# Patient Record
Sex: FEMALE | Race: WHITE | NOT HISPANIC OR LATINO | Employment: FULL TIME | URBAN - METROPOLITAN AREA
[De-identification: names, ages, dates, MRNs, and addresses within clinical notes are randomized per-mention and may not be internally consistent; named-entity substitution may affect disease eponyms.]

---

## 2023-07-26 ENCOUNTER — LAB (OUTPATIENT)
Dept: LAB | Facility: LAB | Age: 23
End: 2023-07-26
Payer: COMMERCIAL

## 2023-07-26 ENCOUNTER — OFFICE VISIT (OUTPATIENT)
Dept: PRIMARY CARE | Facility: CLINIC | Age: 23
End: 2023-07-26
Payer: COMMERCIAL

## 2023-07-26 VITALS
HEIGHT: 63 IN | BODY MASS INDEX: 23.53 KG/M2 | DIASTOLIC BLOOD PRESSURE: 86 MMHG | SYSTOLIC BLOOD PRESSURE: 122 MMHG | OXYGEN SATURATION: 99 % | HEART RATE: 71 BPM | WEIGHT: 132.8 LBS

## 2023-07-26 DIAGNOSIS — G43.109 MIGRAINE WITH AURA AND WITHOUT STATUS MIGRAINOSUS, NOT INTRACTABLE: Primary | ICD-10-CM

## 2023-07-26 DIAGNOSIS — K58.2 IRRITABLE BOWEL SYNDROME WITH BOTH CONSTIPATION AND DIARRHEA: ICD-10-CM

## 2023-07-26 DIAGNOSIS — R53.83 OTHER FATIGUE: ICD-10-CM

## 2023-07-26 DIAGNOSIS — F41.0 PANIC ATTACK: ICD-10-CM

## 2023-07-26 DIAGNOSIS — F41.9 ANXIETY: ICD-10-CM

## 2023-07-26 LAB
ALANINE AMINOTRANSFERASE (SGPT) (U/L) IN SER/PLAS: 9 U/L (ref 7–45)
ALBUMIN (G/DL) IN SER/PLAS: 4.5 G/DL (ref 3.4–5)
ALKALINE PHOSPHATASE (U/L) IN SER/PLAS: 50 U/L (ref 33–110)
ANION GAP IN SER/PLAS: 11 MMOL/L (ref 10–20)
ASPARTATE AMINOTRANSFERASE (SGOT) (U/L) IN SER/PLAS: 17 U/L (ref 9–39)
BASOPHILS (10*3/UL) IN BLOOD BY AUTOMATED COUNT: 0.06 X10E9/L (ref 0–0.1)
BASOPHILS/100 LEUKOCYTES IN BLOOD BY AUTOMATED COUNT: 1.2 % (ref 0–2)
BILIRUBIN TOTAL (MG/DL) IN SER/PLAS: 0.5 MG/DL (ref 0–1.2)
CALCIUM (MG/DL) IN SER/PLAS: 9.4 MG/DL (ref 8.6–10.3)
CARBON DIOXIDE, TOTAL (MMOL/L) IN SER/PLAS: 26 MMOL/L (ref 21–32)
CHLORIDE (MMOL/L) IN SER/PLAS: 105 MMOL/L (ref 98–107)
CREATININE (MG/DL) IN SER/PLAS: 0.78 MG/DL (ref 0.5–1.05)
EOSINOPHILS (10*3/UL) IN BLOOD BY AUTOMATED COUNT: 0.11 X10E9/L (ref 0–0.7)
EOSINOPHILS/100 LEUKOCYTES IN BLOOD BY AUTOMATED COUNT: 2.2 % (ref 0–6)
ERYTHROCYTE DISTRIBUTION WIDTH (RATIO) BY AUTOMATED COUNT: 12.6 % (ref 11.5–14.5)
ERYTHROCYTE MEAN CORPUSCULAR HEMOGLOBIN CONCENTRATION (G/DL) BY AUTOMATED: 32.5 G/DL (ref 32–36)
ERYTHROCYTE MEAN CORPUSCULAR VOLUME (FL) BY AUTOMATED COUNT: 96 FL (ref 80–100)
ERYTHROCYTES (10*6/UL) IN BLOOD BY AUTOMATED COUNT: 4.12 X10E12/L (ref 4–5.2)
GFR FEMALE: >90 ML/MIN/1.73M2
GLUCOSE (MG/DL) IN SER/PLAS: 79 MG/DL (ref 74–99)
HEMATOCRIT (%) IN BLOOD BY AUTOMATED COUNT: 39.7 % (ref 36–46)
HEMOGLOBIN (G/DL) IN BLOOD: 12.9 G/DL (ref 12–16)
IMMATURE GRANULOCYTES/100 LEUKOCYTES IN BLOOD BY AUTOMATED COUNT: 0.2 % (ref 0–0.9)
LEUKOCYTES (10*3/UL) IN BLOOD BY AUTOMATED COUNT: 5 X10E9/L (ref 4.4–11.3)
LYMPHOCYTES (10*3/UL) IN BLOOD BY AUTOMATED COUNT: 1.42 X10E9/L (ref 1.2–4.8)
LYMPHOCYTES/100 LEUKOCYTES IN BLOOD BY AUTOMATED COUNT: 28.5 % (ref 13–44)
MONOCYTES (10*3/UL) IN BLOOD BY AUTOMATED COUNT: 0.42 X10E9/L (ref 0.1–1)
MONOCYTES/100 LEUKOCYTES IN BLOOD BY AUTOMATED COUNT: 8.4 % (ref 2–10)
NEUTROPHILS (10*3/UL) IN BLOOD BY AUTOMATED COUNT: 2.97 X10E9/L (ref 1.2–7.7)
NEUTROPHILS/100 LEUKOCYTES IN BLOOD BY AUTOMATED COUNT: 59.5 % (ref 40–80)
PLATELETS (10*3/UL) IN BLOOD AUTOMATED COUNT: 344 X10E9/L (ref 150–450)
POTASSIUM (MMOL/L) IN SER/PLAS: 4.2 MMOL/L (ref 3.5–5.3)
PROTEIN TOTAL: 6.8 G/DL (ref 6.4–8.2)
SODIUM (MMOL/L) IN SER/PLAS: 138 MMOL/L (ref 136–145)
THYROTROPIN (MIU/L) IN SER/PLAS BY DETECTION LIMIT <= 0.05 MIU/L: 0.8 MIU/L (ref 0.44–3.98)
UREA NITROGEN (MG/DL) IN SER/PLAS: 9 MG/DL (ref 6–23)

## 2023-07-26 PROCEDURE — 80053 COMPREHEN METABOLIC PANEL: CPT

## 2023-07-26 PROCEDURE — 84443 ASSAY THYROID STIM HORMONE: CPT

## 2023-07-26 PROCEDURE — 85025 COMPLETE CBC W/AUTO DIFF WBC: CPT

## 2023-07-26 PROCEDURE — 1036F TOBACCO NON-USER: CPT | Performed by: INTERNAL MEDICINE

## 2023-07-26 PROCEDURE — 99204 OFFICE O/P NEW MOD 45 MIN: CPT | Performed by: INTERNAL MEDICINE

## 2023-07-26 PROCEDURE — 36415 COLL VENOUS BLD VENIPUNCTURE: CPT

## 2023-07-26 RX ORDER — BUSPIRONE HYDROCHLORIDE 5 MG/1
5 TABLET ORAL 2 TIMES DAILY
Qty: 60 TABLET | Refills: 3 | Status: SHIPPED | OUTPATIENT
Start: 2023-07-26 | End: 2023-08-23 | Stop reason: SDUPTHER

## 2023-07-26 ASSESSMENT — ENCOUNTER SYMPTOMS
BLOOD IN STOOL: 0
ABDOMINAL PAIN: 0
UNEXPECTED WEIGHT CHANGE: 0
BACK PAIN: 0
FATIGUE: 1
COUGH: 0
NAUSEA: 0
DIARRHEA: 1
PALPITATIONS: 0
ARTHRALGIAS: 0
CHEST TIGHTNESS: 0
CONSTIPATION: 1
VOMITING: 0
WHEEZING: 0
SHORTNESS OF BREATH: 0

## 2023-07-26 NOTE — ASSESSMENT & PLAN NOTE
-Of course her fatigue may be secondary to busy lifestyle but we have agreed to check some laboratory tests and she will go at her earliest convenience

## 2023-07-26 NOTE — ASSESSMENT & PLAN NOTE
-We briefly discussed the drug Topamax  -I am giving her a handout on migraine treatments and when she returns we will discuss further  -We also discussed possibly seeing a neurologist as there are some newer migraine prevention medications that are injectable and quite effective

## 2023-07-26 NOTE — PROGRESS NOTES
Pt is here today to get est with PCP C/O panic attacks.    Bactrim Counseling:  I discussed with the patient the risks of sulfa antibiotics including but not limited to GI upset, allergic reaction, drug rash, diarrhea, dizziness, photosensitivity, and yeast infections.  Rarely, more serious reactions can occur including but not limited to aplastic anemia, agranulocytosis, methemoglobinemia, blood dyscrasias, liver or kidney failure, lung infiltrates or desquamative/blistering drug rashes.

## 2023-07-26 NOTE — PATIENT INSTRUCTIONS
As we discussed I have ordered several lab test to be done and I would like for you to go fasting since 1 involves your blood sugar level.  Please try to go before your next follow-up appointment in approximately 3 weeks  The staff is going to help you get an appointment with a counselor and I do suggest you call your number on your insurance card to get a list of approved providers  I sent a medication to [facility] called BuSpar at just 5 mg twice daily.  Please remember that this is considered a very low dose and please call if you feel like it is making your condition worse.  We can increase the dose as necessary  I would like you to consider getting in for a Pap exam at some point in the future  The probiotic I am recommending is called align and I suggest you try that until your next visit  -Please read the handout we gave you today on migraine headaches   Call placed to patient. VM. Message left asking to call back and ask to speak with triage.

## 2023-07-26 NOTE — ASSESSMENT & PLAN NOTE
-I have specifically recommended that she get the over-the-counter probiotic called align and when she returns we will discuss her response

## 2023-07-26 NOTE — ASSESSMENT & PLAN NOTE
-She has tried sertraline in the past and had a pretty bad reaction with the development of suicidal ideations  -I am sending a prescription to [facility] for BuSpar 5 mg twice daily.  She understands that we are starting at a very low dose and we can titrate upward as needed  -She knows to call right away if she feels like her condition is worsening as opposed to getting better  -We are also going to refer her to counseling

## 2023-07-26 NOTE — PROGRESS NOTES
Subjective   Patient ID: Jordy Mehta is a 23 y.o. female who presents for No chief complaint on file..  HPI   She is here today to get established.  She has had concerns about her anxiety levels and also has been experiencing some panic attacks.  We did review her past medical history and she states she has been dealing with anxiety for several years.  It began around her teenage years and she states once before she was prescribed sertraline but after being on it for 4 to 6 weeks she had a terrible reaction.  She states it made her feel terrible and she even developed suicidal ideations.  She states after that event she did not try any other medications.  We talked about trying something different from Zoloft in a different class.  I propose that she may benefit from BuSpar and I also feel strongly that she would benefit from counseling.  She is all receptive to having counseling but obviously wants to make sure it is covered in her plan.  I have suggested that she check with her insurance company regarding a list of approved providers.  She also has a history of migraine headaches which she has been experiencing for many years.  She states that in the past she tried some medications but they were ineffective.  She does not really recall being on Topamax.  She states that she now averages 1-2 migraine headaches a month but in the spring and fall she sometimes gets 2/week.  We talked about developing a preventative strategy and I will be giving her handout that explains migraine headaches and its treatment.  She also complains of feeling fatigue and we will be doing some laboratory testing.  Approximately 1 year ago she went to the emergency department with severe abdominal pain.  She states she had been taking lots of over-the-counter Naprosyn for headaches and it was suggested by the emergency room department that she be seen in follow-up by gastroenterology.  She states she did not go but her symptoms more or less  resolved after she stopped the Naprosyn.  I was able to review her laboratory test results online and there were no severe abnormalities.  She states she does feel like she suffers from IBS and alternates between constipation and diarrhea.  I am going to suggest that she try a specific over-the-counter probiotic and we will see how she responds when she returns.  Review of Systems   Constitutional:  Positive for fatigue. Negative for unexpected weight change.   Respiratory:  Negative for cough, chest tightness, shortness of breath and wheezing.    Cardiovascular:  Negative for chest pain, palpitations and leg swelling.   Gastrointestinal:  Positive for constipation and diarrhea. Negative for abdominal pain, blood in stool, nausea and vomiting.   Musculoskeletal:  Negative for arthralgias and back pain.     Objective   Physical Exam  Vitals and nursing note reviewed.   Constitutional:       General: She is not in acute distress.     Appearance: Normal appearance.   HENT:      Head: Normocephalic and atraumatic.   Eyes:      Conjunctiva/sclera: Conjunctivae normal.   Cardiovascular:      Rate and Rhythm: Normal rate and regular rhythm.      Heart sounds: Normal heart sounds.   Pulmonary:      Effort: No respiratory distress.      Breath sounds: No wheezing.   Abdominal:      Palpations: Abdomen is soft.      Tenderness: There is no abdominal tenderness. There is no guarding.   Musculoskeletal:         General: No swelling. Normal range of motion.   Skin:     General: Skin is warm and dry.   Neurological:      General: No focal deficit present.      Mental Status: She is alert and oriented to person, place, and time.   Psychiatric:         Behavior: Behavior normal.       Assessment/Plan   Problem List Items Addressed This Visit       Panic attack    Relevant Orders    Referral to Psychology    Migraine with aura and without status migrainosus, not intractable - Primary     -We briefly discussed the drug Topamax  -I am  giving her a handout on migraine treatments and when she returns we will discuss further  -We also discussed possibly seeing a neurologist as there are some newer migraine prevention medications that are injectable and quite effective         Irritable bowel syndrome with both constipation and diarrhea     -I have specifically recommended that she get the over-the-counter probiotic called align and when she returns we will discuss her response         Other fatigue     -Of course her fatigue may be secondary to busy lifestyle but we have agreed to check some laboratory tests and she will go at her earliest convenience         Relevant Medications    busPIRone (Buspar) 5 mg tablet    Other Relevant Orders    CBC and Auto Differential    Comprehensive Metabolic Panel    TSH    Anxiety     -She has tried sertraline in the past and had a pretty bad reaction with the development of suicidal ideations  -I am sending a prescription to [facility] for BuSpar 5 mg twice daily.  She understands that we are starting at a very low dose and we can titrate upward as needed  -She knows to call right away if she feels like her condition is worsening as opposed to getting better  -We are also going to refer her to counseling         Relevant Orders    Referral to Psychology          Lisa Hutson, DO

## 2023-08-17 ENCOUNTER — OFFICE VISIT (OUTPATIENT)
Dept: PRIMARY CARE | Facility: CLINIC | Age: 23
End: 2023-08-17
Payer: COMMERCIAL

## 2023-08-17 VITALS
SYSTOLIC BLOOD PRESSURE: 108 MMHG | WEIGHT: 129.2 LBS | OXYGEN SATURATION: 98 % | BODY MASS INDEX: 22.89 KG/M2 | HEART RATE: 81 BPM | DIASTOLIC BLOOD PRESSURE: 68 MMHG | HEIGHT: 63 IN

## 2023-08-17 DIAGNOSIS — R53.83 OTHER FATIGUE: Primary | ICD-10-CM

## 2023-08-17 DIAGNOSIS — K58.2 IRRITABLE BOWEL SYNDROME WITH BOTH CONSTIPATION AND DIARRHEA: ICD-10-CM

## 2023-08-17 DIAGNOSIS — F41.9 ANXIETY: ICD-10-CM

## 2023-08-17 DIAGNOSIS — N63.13 MASS OF LOWER OUTER QUADRANT OF RIGHT BREAST: ICD-10-CM

## 2023-08-17 DIAGNOSIS — G43.109 MIGRAINE WITH AURA AND WITHOUT STATUS MIGRAINOSUS, NOT INTRACTABLE: ICD-10-CM

## 2023-08-17 PROCEDURE — 1036F TOBACCO NON-USER: CPT | Performed by: INTERNAL MEDICINE

## 2023-08-17 PROCEDURE — 99214 OFFICE O/P EST MOD 30 MIN: CPT | Performed by: INTERNAL MEDICINE

## 2023-08-17 NOTE — ASSESSMENT & PLAN NOTE
-She appears to be doing relatively well on BuSpar 5 mg twice daily and she will call if its not effective  -I am also pleased to hear that she wants to go to counseling and hopefully that can happen soon

## 2023-08-17 NOTE — PATIENT INSTRUCTIONS
Please continue taking the buspirone 5 mg twice daily and keep in mind that if necessary we can increase the dose to 3 times a day or possibly increase the total dose  Please try to make arrangements to see your counselor as quickly as possible  Please contact your dentist about your tooth  Please contact us about your migraines if they become an issue  Please go for your mammogram and ultrasound because we want to make sure were not missing something like cancer.  I will contact you with the results when it comes back

## 2023-08-17 NOTE — PROGRESS NOTES
Subjective   Patient ID: Jordy Mehta is a 23 y.o. female who presents for 3 wk F/U.  HPI  She is here today for follow-up.  She has been taking the buspirone and she states that it is definitely helped.  In fact she states she had a day where she suffered 2 flat tires and a broken tooth.  She states normally it would have been very taxing but she was able to deal with it in a more calm manner.  We talked about how she is on a low-dose medication and if necessary we could go up on frequency or total dose.  She will keep that in mind and let us know if things or not going well.  She states she also definitely wants to go to counseling but needs to wait a little while until things settle down.  She states she does not have reliable transportation.  I told her that sometimes her opportunities to do virtual visits where she would not need to travel.  We also discussed migraine headaches and specifically Topamax.  She states she was able to read the handout I gave her and for now she does not want starting other medications due to financial concerns.  She also states that she has a breast lump on the right breast.  She states she went to [facility] year ago and they wanted her to get a mammogram but she did not go.  She states she is not thinking that it has changed in size but I definitely would like for her to get a diagnostic mammogram and ultrasound.  I have agreed to call her with results.  She understands that the likelihood of breast cancer is low but unfortunately not 0 and we want to make sure that this is not something that is dangerous.  She also recently had a tooth extracted and wanted to see if maybe she was infected.  I told her to get in contact with her dentist right away as they may want to see her for follow-up.  Objective   Physical Exam  Vitals and nursing note reviewed.   Constitutional:       General: She is not in acute distress.     Appearance: Normal appearance.   HENT:      Head:  Normocephalic and atraumatic.   Eyes:      Conjunctiva/sclera: Conjunctivae normal.   Cardiovascular:      Rate and Rhythm: Normal rate and regular rhythm.      Heart sounds: Normal heart sounds.   Pulmonary:      Effort: No respiratory distress.      Breath sounds: No wheezing.   Abdominal:      Palpations: Abdomen is soft.      Tenderness: There is no abdominal tenderness. There is no guarding.   Musculoskeletal:         General: No swelling. Normal range of motion.   Skin:     General: Skin is warm and dry.   Neurological:      General: No focal deficit present.      Mental Status: She is alert and oriented to person, place, and time.   Psychiatric:         Behavior: Behavior normal.       Recent Results (from the past 672 hour(s))   CBC and Auto Differential    Collection Time: 07/26/23 10:01 AM   Result Value Ref Range    WBC 5.0 4.4 - 11.3 x10E9/L    RBC 4.12 4.00 - 5.20 x10E12/L    Hemoglobin 12.9 12.0 - 16.0 g/dL    Hematocrit 39.7 36.0 - 46.0 %    MCV 96 80 - 100 fL    MCHC 32.5 32.0 - 36.0 g/dL    Platelets 344 150 - 450 x10E9/L    RDW 12.6 11.5 - 14.5 %    Neutrophils % 59.5 40.0 - 80.0 %    Immature Granulocytes %, Automated 0.2 0.0 - 0.9 %    Lymphocytes % 28.5 13.0 - 44.0 %    Monocytes % 8.4 2.0 - 10.0 %    Eosinophils % 2.2 0.0 - 6.0 %    Basophils % 1.2 0.0 - 2.0 %    Neutrophils Absolute 2.97 1.20 - 7.70 x10E9/L    Lymphocytes Absolute 1.42 1.20 - 4.80 x10E9/L    Monocytes Absolute 0.42 0.10 - 1.00 x10E9/L    Eosinophils Absolute 0.11 0.00 - 0.70 x10E9/L    Basophils Absolute 0.06 0.00 - 0.10 x10E9/L   Comprehensive Metabolic Panel    Collection Time: 07/26/23 10:01 AM   Result Value Ref Range    Glucose 79 74 - 99 mg/dL    Sodium 138 136 - 145 mmol/L    Potassium 4.2 3.5 - 5.3 mmol/L    Chloride 105 98 - 107 mmol/L    Bicarbonate 26 21 - 32 mmol/L    Anion Gap 11 10 - 20 mmol/L    Urea Nitrogen 9 6 - 23 mg/dL    Creatinine 0.78 0.50 - 1.05 mg/dL    GFR Female >90 >90 mL/min/1.73m2    Calcium 9.4  8.6 - 10.3 mg/dL    Albumin 4.5 3.4 - 5.0 g/dL    Alkaline Phosphatase 50 33 - 110 U/L    Total Protein 6.8 6.4 - 8.2 g/dL    AST 17 9 - 39 U/L    Total Bilirubin 0.5 0.0 - 1.2 mg/dL    ALT (SGPT) 9 7 - 45 U/L   TSH    Collection Time: 07/26/23 10:01 AM   Result Value Ref Range    TSH 0.80 0.44 - 3.98 mIU/L       Assessment/Plan   Problem List Items Addressed This Visit       Migraine with aura and without status migrainosus, not intractable     -She will contact us if she is having more issues with migraines and again we did discuss Topamax         Irritable bowel syndrome with both constipation and diarrhea     -She tried the align and did not really see a big difference         Other fatigue - Primary     -All of her laboratory test results came back perfect including thyroid and blood count         Anxiety     -She appears to be doing relatively well on BuSpar 5 mg twice daily and she will call if its not effective  -I am also pleased to hear that she wants to go to counseling and hopefully that can happen soon         Mass of lower outer quadrant of right breast     -She understands that I definitely want her to go for a diagnostic mammogram and ultrasound.  Once these results are back I will contact her and initiate a plan for future follow-up if necessary  -Also remind her to do a monthly self breast exam         Relevant Orders    BI mammo right diagnostic    BI US breast limited right          Lisa Hutson, DO

## 2023-08-17 NOTE — ASSESSMENT & PLAN NOTE
-She understands that I definitely want her to go for a diagnostic mammogram and ultrasound.  Once these results are back I will contact her and initiate a plan for future follow-up if necessary  -Also remind her to do a monthly self breast exam

## 2023-08-23 ENCOUNTER — TELEPHONE (OUTPATIENT)
Dept: PRIMARY CARE | Facility: CLINIC | Age: 23
End: 2023-08-23
Payer: COMMERCIAL

## 2023-08-23 DIAGNOSIS — R53.83 OTHER FATIGUE: ICD-10-CM

## 2023-08-23 DIAGNOSIS — N63.13 MASS OF LOWER OUTER QUADRANT OF RIGHT BREAST: Primary | ICD-10-CM

## 2023-08-23 RX ORDER — BUSPIRONE HYDROCHLORIDE 5 MG/1
5 TABLET ORAL 2 TIMES DAILY
Qty: 60 TABLET | Refills: 3 | Status: SHIPPED | OUTPATIENT
Start: 2023-08-23 | End: 2023-12-27

## 2023-12-26 DIAGNOSIS — R53.83 OTHER FATIGUE: ICD-10-CM

## 2023-12-27 RX ORDER — BUSPIRONE HYDROCHLORIDE 5 MG/1
5 TABLET ORAL 2 TIMES DAILY
Qty: 60 TABLET | Refills: 3 | Status: SHIPPED | OUTPATIENT
Start: 2023-12-27 | End: 2024-01-15

## 2024-01-04 ENCOUNTER — APPOINTMENT (OUTPATIENT)
Dept: PRIMARY CARE | Facility: CLINIC | Age: 24
End: 2024-01-04
Payer: COMMERCIAL

## 2024-01-11 ENCOUNTER — OFFICE VISIT (OUTPATIENT)
Dept: PRIMARY CARE | Facility: CLINIC | Age: 24
End: 2024-01-11
Payer: COMMERCIAL

## 2024-01-11 VITALS
SYSTOLIC BLOOD PRESSURE: 122 MMHG | HEART RATE: 61 BPM | OXYGEN SATURATION: 98 % | WEIGHT: 135 LBS | DIASTOLIC BLOOD PRESSURE: 80 MMHG | BODY MASS INDEX: 23.91 KG/M2

## 2024-01-11 DIAGNOSIS — R11.0 NAUSEA: ICD-10-CM

## 2024-01-11 DIAGNOSIS — F41.9 ANXIETY: ICD-10-CM

## 2024-01-11 DIAGNOSIS — N63.13 MASS OF LOWER OUTER QUADRANT OF RIGHT BREAST: Primary | ICD-10-CM

## 2024-01-11 PROCEDURE — 1036F TOBACCO NON-USER: CPT | Performed by: INTERNAL MEDICINE

## 2024-01-11 PROCEDURE — 99213 OFFICE O/P EST LOW 20 MIN: CPT | Performed by: INTERNAL MEDICINE

## 2024-01-11 ASSESSMENT — ENCOUNTER SYMPTOMS
FATIGUE: 0
NAUSEA: 1
ARTHRALGIAS: 0
WHEEZING: 0
PALPITATIONS: 1
BACK PAIN: 0
VOMITING: 0
COUGH: 0
SHORTNESS OF BREATH: 0
DIARRHEA: 0

## 2024-01-11 ASSESSMENT — PATIENT HEALTH QUESTIONNAIRE - PHQ9
1. LITTLE INTEREST OR PLEASURE IN DOING THINGS: NOT AT ALL
2. FEELING DOWN, DEPRESSED OR HOPELESS: NOT AT ALL
SUM OF ALL RESPONSES TO PHQ9 QUESTIONS 1 AND 2: 0

## 2024-01-11 NOTE — ASSESSMENT & PLAN NOTE
-She will try over-the-counter famotidine just for couple weeks and let me know if this helps with her symptoms at all

## 2024-01-11 NOTE — PATIENT INSTRUCTIONS
As we discussed I would like to hear from you on Monday regarding your buspirone dose and we are happy to send what you need to your pharmacy  Please remember to schedule ultrasound at some point in February just to make sure we follow-up the abnormality that was identified on your previous ultrasound.  I will be very happy to call you with those results  Please try the famotidine 20 mg twice daily for a couple of weeks and let me know how you are doing

## 2024-01-11 NOTE — ASSESSMENT & PLAN NOTE
-Historically she has done well on BuSpar 5 mg twice daily but recently symptoms have increased  -She is going to try either 7.5 mg or 10 mg and give me an update on Monday.  It is at that time we will send in a refill

## 2024-01-11 NOTE — ASSESSMENT & PLAN NOTE
-I asked that she schedule a follow-up at some point in February just to make sure you do not forget about doing the ultrasound

## 2024-01-11 NOTE — PROGRESS NOTES
"Subjective   Patient ID: Jordy Mehta is a 24 y.o. female who presents for Follow-up (Anxiety seems to be increasing. Patient is still having nausea and vomiting. ).  HPI  She is here today for her routine checkup.  We discussed her anxiety and she explains that the buspirone has been very helpful as she has been taking it twice a day.  Unfortunately recently she has been having some increased symptoms particularly in the evenings.  She has been experiencing some palpitations and she states \"I almost had a panic attack last night for no reason\".  She states that her symptoms have historically occurred in the evening morning and everything is quiet.  We decided to increase the dose of her BuSpar and I explained that we could try either a 7.5 mg dose or a 10 mg dose.  Over the next several days she will experiment by taking either 1-1/2 tablets or 2 at the same time and then she will give me an update on Monday on how that went.  I told her there are several doses and we can adjust accordingly.  We also discussed her ultrasound of her breast that she had several months ago.  They did see a tiny lesion and the recommendation was to do another ultrasound as a 6-month follow-up which will be in February.  We will get that scheduled so we do not forget to do the follow-up and I have agreed to call her with those results.  We also talked about her chronic nausea symptoms.  She explains that in the mornings she experiences nauseousness and on occasion she throws up.  She does the \"dry heaves \".  She states she has taken Zofran in the past.  She states she has had the symptoms for many years as they started in her teenage years and she is always associated with anxiety.  She states her mother has the same problem.  She did try omeprazole and she states she felt like it made her condition worse.  Did ask her just to try some famotidine twice a day for just a week or so and let me know if this helped.  She will keep us " apprised.  Review of Systems   Constitutional:  Negative for fatigue.   Respiratory:  Negative for cough, shortness of breath and wheezing.    Cardiovascular:  Positive for palpitations. Negative for chest pain and leg swelling.   Gastrointestinal:  Positive for nausea. Negative for diarrhea and vomiting.   Musculoskeletal:  Negative for arthralgias and back pain.     Objective   Physical Exam  Vitals and nursing note reviewed.   Constitutional:       General: She is not in acute distress.     Appearance: Normal appearance.   HENT:      Head: Normocephalic and atraumatic.   Eyes:      Conjunctiva/sclera: Conjunctivae normal.   Cardiovascular:      Rate and Rhythm: Normal rate and regular rhythm.      Heart sounds: Normal heart sounds.   Pulmonary:      Effort: No respiratory distress.      Breath sounds: No wheezing.   Abdominal:      Palpations: Abdomen is soft.      Tenderness: There is no abdominal tenderness. There is no guarding.   Musculoskeletal:         General: No swelling. Normal range of motion.   Skin:     General: Skin is warm and dry.   Neurological:      General: No focal deficit present.      Mental Status: She is alert and oriented to person, place, and time.   Psychiatric:         Behavior: Behavior normal.       Assessment/Plan   Problem List Items Addressed This Visit             ICD-10-CM    Anxiety F41.9     -Historically she has done well on BuSpar 5 mg twice daily but recently symptoms have increased  -She is going to try either 7.5 mg or 10 mg and give me an update on Monday.  It is at that time we will send in a refill         Mass of lower outer quadrant of right breast - Primary N63.13     -I asked that she schedule a follow-up at some point in February just to make sure you do not forget about doing the ultrasound         Relevant Orders    BI US breast limited right    Nausea R11.0     -She will try over-the-counter famotidine just for couple weeks and let me know if this helps with her  symptoms at all               Lisa S Royal, DO

## 2024-01-15 ENCOUNTER — TELEPHONE (OUTPATIENT)
Dept: PRIMARY CARE | Facility: CLINIC | Age: 24
End: 2024-01-15
Payer: COMMERCIAL

## 2024-01-15 DIAGNOSIS — F41.9 ANXIETY: Primary | ICD-10-CM

## 2024-01-15 RX ORDER — BUSPIRONE HYDROCHLORIDE 10 MG/1
10 TABLET ORAL 2 TIMES DAILY
Qty: 60 TABLET | Refills: 5 | Status: SHIPPED | OUTPATIENT
Start: 2024-01-15 | End: 2025-01-14

## 2024-01-15 NOTE — TELEPHONE ENCOUNTER
Patient called to update provider on Buspirone. Patient states that she is now taking 2 tabs twice a day and it's been working good.

## 2024-01-15 NOTE — TELEPHONE ENCOUNTER
That is great to hear  Please let her know that I sent a new prescription in for the 10 mg dose so she will take 1 tablet twice daily thereafter.  Thank you

## 2024-02-29 ENCOUNTER — APPOINTMENT (OUTPATIENT)
Dept: RADIOLOGY | Facility: HOSPITAL | Age: 24
End: 2024-02-29
Payer: COMMERCIAL

## 2024-07-11 ENCOUNTER — APPOINTMENT (OUTPATIENT)
Dept: PRIMARY CARE | Facility: CLINIC | Age: 24
End: 2024-07-11
Payer: COMMERCIAL

## 2024-07-11 VITALS
WEIGHT: 131 LBS | HEART RATE: 69 BPM | DIASTOLIC BLOOD PRESSURE: 64 MMHG | OXYGEN SATURATION: 98 % | SYSTOLIC BLOOD PRESSURE: 112 MMHG | BODY MASS INDEX: 23.21 KG/M2 | HEIGHT: 63 IN

## 2024-07-11 DIAGNOSIS — N63.13 MASS OF LOWER OUTER QUADRANT OF RIGHT BREAST: ICD-10-CM

## 2024-07-11 DIAGNOSIS — F41.9 ANXIETY: ICD-10-CM

## 2024-07-11 DIAGNOSIS — R11.0 NAUSEA: Primary | ICD-10-CM

## 2024-07-11 PROCEDURE — 1036F TOBACCO NON-USER: CPT | Performed by: INTERNAL MEDICINE

## 2024-07-11 PROCEDURE — 99213 OFFICE O/P EST LOW 20 MIN: CPT | Performed by: INTERNAL MEDICINE

## 2024-07-11 RX ORDER — ONDANSETRON 4 MG/1
4 TABLET, ORALLY DISINTEGRATING ORAL EVERY 8 HOURS PRN
Qty: 20 TABLET | Refills: 2 | Status: SHIPPED | OUTPATIENT
Start: 2024-07-11 | End: 2024-07-18

## 2024-07-11 RX ORDER — BUSPIRONE HYDROCHLORIDE 10 MG/1
10 TABLET ORAL 2 TIMES DAILY
Qty: 180 TABLET | Refills: 1 | Status: SHIPPED | OUTPATIENT
Start: 2024-07-11 | End: 2025-07-11

## 2024-07-11 ASSESSMENT — ENCOUNTER SYMPTOMS
PALPITATIONS: 0
FATIGUE: 0
COUGH: 0
ARTHRALGIAS: 0
VOMITING: 0
ABDOMINAL PAIN: 0
BACK PAIN: 0
DIARRHEA: 0
ROS GI COMMENTS: INTERMITTENT NAUSEA
BLOOD IN STOOL: 0
SHORTNESS OF BREATH: 0
WHEEZING: 0

## 2024-07-11 NOTE — ASSESSMENT & PLAN NOTE
-Reports doing well on her current dose of buspirone so we are providing a 6-month refill and we will see her back shortly thereafter

## 2024-07-11 NOTE — PATIENT INSTRUCTIONS
As we discussed I would like for you to call us back with the name of the gastroenterologist you are permitted to see with your insurance plan and we will help make a referral  The staff will be helping to get your ultrasound scheduled again for your right breast and please remember to go out.  I will contact you with the results  We provided refills on your buspirone for 6 months and I would like to see you back at the 6-month juncture for reevaluation.  Please do not hesitate to reach out if you are not doing well

## 2024-07-11 NOTE — PROGRESS NOTES
Subjective   Patient ID: Jordy Mehta is a 24 y.o. female who presents for Follow-up (6 MO FUV).  HPI  She is today for her general 6-month checkup.  Overall she reports feeling relatively well.  She states the buspirone has been taking care of her anxiety overall and she has had very minimal panic attacks.  She does however continue to have bouts of nauseousness.  They are reminded that she has had problems with nausea for well over a decade.  She states she recalls when she was ready to go to school she was started getting really nauseous and even throw up before she went.  She states that she on average will have 1 or 2 episodes a day and they can last anywhere from 10 to 20 minutes.  She states sometimes she throws up and sometimes she does not.  She states that her mother is very similar.  She states her mother has significant anxiety and is also suffer from nausea linked to the anxiety.  She has had no complaints of swallowing difficulties or abdominal pain and her weight has been stable.  She also states that she very rarely takes over-the-counter NSAIDs because 1 time she took it for a while and it caused her stomach to be very irritated.  We talked about seeing a gastroenterologist and she is agreeable but she wants to check with her insurance first to get a list of providers and then she will get back with us.  In the meantime I did agree to give her a prescription for Zofran.  Also she had an abnormal finding on her mammogram back in August 2023 and the agreement was to go back in 6 months for another ultrasound.  She states in February she was having car problems and ended up not being able to go but she is willing to get it scheduled now and we will try to get her in as quickly as possible.  I have agreed to contact her with results.  Review of Systems   Constitutional:  Negative for fatigue.   Respiratory:  Negative for cough, shortness of breath and wheezing.    Cardiovascular:  Negative for chest pain,  palpitations and leg swelling.   Gastrointestinal:  Negative for abdominal pain, blood in stool, diarrhea and vomiting.        Intermittent nausea   Musculoskeletal:  Negative for arthralgias and back pain.     Objective   Physical Exam  Vitals and nursing note reviewed.   Constitutional:       General: She is not in acute distress.     Appearance: Normal appearance.   HENT:      Head: Normocephalic and atraumatic.   Eyes:      Conjunctiva/sclera: Conjunctivae normal.   Cardiovascular:      Rate and Rhythm: Normal rate and regular rhythm.      Heart sounds: Normal heart sounds.   Pulmonary:      Effort: No respiratory distress.      Breath sounds: No wheezing.   Abdominal:      Palpations: Abdomen is soft.      Tenderness: There is no abdominal tenderness. There is no guarding.   Musculoskeletal:         General: No swelling. Normal range of motion.   Skin:     General: Skin is warm and dry.   Neurological:      General: No focal deficit present.      Mental Status: She is alert and oriented to person, place, and time.   Psychiatric:         Behavior: Behavior normal.       Assessment/Plan   Problem List Items Addressed This Visit             ICD-10-CM    Anxiety F41.9     -Reports doing well on her current dose of buspirone so we are providing a 6-month refill and we will see her back shortly thereafter         Relevant Medications    busPIRone (Buspar) 10 mg tablet    Mass of lower outer quadrant of right breast N63.13     -She will go soon for her ultrasound and have agreed to contact her with result         Relevant Orders    BI US breast limited right    Nausea - Primary R11.0     -Her symptoms have been present for many years and sometimes she will have nausea with vomiting  -She associates that with her anxiety but she states she averages 1 or 2 bouts a day and it can last anywhere from 10 to 20 minutes  -We did try famotidine but she states it did not make a difference  -She states her mother is very similar  with the anxiety and nausea  -I have suggested that she would benefit from seeing a gastroenterologist and she is agreeable and will check with insurance and get back with us  -In the meantime I am providing a prescription for Zofran           Relevant Medications    ondansetron ODT (Zofran-ODT) 4 mg disintegrating tablet          Lisa Hutson,

## 2024-07-11 NOTE — ASSESSMENT & PLAN NOTE
-Her symptoms have been present for many years and sometimes she will have nausea with vomiting  -She associates that with her anxiety but she states she averages 1 or 2 bouts a day and it can last anywhere from 10 to 20 minutes  -We did try famotidine but she states it did not make a difference  -She states her mother is very similar with the anxiety and nausea  -I have suggested that she would benefit from seeing a gastroenterologist and she is agreeable and will check with insurance and get back with us  -In the meantime I am providing a prescription for Zofran

## 2024-10-17 ENCOUNTER — HOSPITAL ENCOUNTER (OUTPATIENT)
Dept: RADIOLOGY | Facility: HOSPITAL | Age: 24
Discharge: HOME | End: 2024-10-17
Payer: COMMERCIAL

## 2024-10-17 DIAGNOSIS — N63.13 MASS OF LOWER OUTER QUADRANT OF RIGHT BREAST: ICD-10-CM

## 2024-10-17 PROCEDURE — 76642 ULTRASOUND BREAST LIMITED: CPT | Mod: RT

## 2024-10-17 PROCEDURE — 76642 ULTRASOUND BREAST LIMITED: CPT | Mod: RIGHT SIDE | Performed by: RADIOLOGY

## 2025-01-28 ENCOUNTER — APPOINTMENT (OUTPATIENT)
Age: 25
End: 2025-01-28
Payer: COMMERCIAL

## 2025-01-28 VITALS
OXYGEN SATURATION: 100 % | BODY MASS INDEX: 23.18 KG/M2 | WEIGHT: 130.8 LBS | HEIGHT: 63 IN | DIASTOLIC BLOOD PRESSURE: 76 MMHG | HEART RATE: 78 BPM | SYSTOLIC BLOOD PRESSURE: 126 MMHG

## 2025-01-28 DIAGNOSIS — K58.2 IRRITABLE BOWEL SYNDROME WITH BOTH CONSTIPATION AND DIARRHEA: Primary | ICD-10-CM

## 2025-01-28 DIAGNOSIS — F41.9 ANXIETY: ICD-10-CM

## 2025-01-28 PROCEDURE — 1036F TOBACCO NON-USER: CPT | Performed by: INTERNAL MEDICINE

## 2025-01-28 PROCEDURE — 99213 OFFICE O/P EST LOW 20 MIN: CPT | Performed by: INTERNAL MEDICINE

## 2025-01-28 PROCEDURE — 3008F BODY MASS INDEX DOCD: CPT | Performed by: INTERNAL MEDICINE

## 2025-01-28 RX ORDER — BUSPIRONE HYDROCHLORIDE 15 MG/1
15 TABLET ORAL 2 TIMES DAILY
Qty: 60 TABLET | Refills: 5 | Status: SHIPPED | OUTPATIENT
Start: 2025-01-28 | End: 2026-01-28

## 2025-01-28 ASSESSMENT — PATIENT HEALTH QUESTIONNAIRE - PHQ9
2. FEELING DOWN, DEPRESSED OR HOPELESS: NOT AT ALL
1. LITTLE INTEREST OR PLEASURE IN DOING THINGS: NOT AT ALL
SUM OF ALL RESPONSES TO PHQ9 QUESTIONS 1 AND 2: 0

## 2025-01-28 NOTE — PROGRESS NOTES
Subjective   Patient ID: Jordy Mehta is a 25 y.o. female who presents for Follow-up (MEDICATION QUESTIONS).  HPI  She is here today for evaluation.  She explains that in the recent months she has been feeling more anxious and at times having symptoms of nauseousness and sometimes dizziness.  She states she is pretty sure that this is because of the anxiety.  We decided to increase the dose of her buspirone from 10 mg up to 15 mg twice daily.  We talked about the various dosing regimens including possibly 3 times a day.  She has agreed to give me an update after several weeks and let me know if her overall symptoms have improved.  If not then we will reassess and possibly even consider changing medication.  Otherwise she did complete her breast ultrasound back in October and all was well.  We also discussed her history of intermittent nausea and we had entertained referring her to gastroenterology but we will wait and see what happens with treatment of her anxiety.  If her symptoms improve we will let well enough alone.  Objective   Physical Exam  Vitals and nursing note reviewed.   Constitutional:       General: She is not in acute distress.     Appearance: Normal appearance.   HENT:      Head: Normocephalic and atraumatic.   Eyes:      Conjunctiva/sclera: Conjunctivae normal.   Cardiovascular:      Rate and Rhythm: Normal rate and regular rhythm.      Heart sounds: Normal heart sounds.   Pulmonary:      Effort: No respiratory distress.      Breath sounds: No wheezing.   Abdominal:      Palpations: Abdomen is soft.      Tenderness: There is no abdominal tenderness. There is no guarding.   Musculoskeletal:         General: No swelling. Normal range of motion.   Skin:     General: Skin is warm and dry.   Neurological:      General: No focal deficit present.      Mental Status: She is alert and oriented to person, place, and time.   Psychiatric:         Behavior: Behavior normal.       Assessment/Plan   Problem List  Items Addressed This Visit             ICD-10-CM    Irritable bowel syndrome with both constipation and diarrhea - Primary K58.2    Anxiety F41.9     -We are increasing her BuSpar from 10 mg up to 15 mg twice daily  -She has agreed to give us an update after couple of weeks about her anxiety, her dizziness, and her gastrointestinal symptoms such as nausea.         Relevant Medications    busPIRone (Buspar) 15 mg tablet   Pt Instructions  As we discussed we have increased the dose of your BuSpar from 10 mg up to 15 mg twice daily.  You can use up your 10 mg tablets by taking 1-1/2 twice a day and then the new prescription will be for the 15 mg dose at your pharmacy  Please give us an update after a week or so to let us know how you are doing with your anxiety, your dizziness, and your anxiety.  Please call sooner if you feel like your condition is worsening as opposed to getting better.  If everything goes according to plan I would see you back in 6 months for another checkup       Lisa Hutson, DO

## 2025-01-28 NOTE — PATIENT INSTRUCTIONS
Pt Instructions  As we discussed we have increased the dose of your BuSpar from 10 mg up to 15 mg twice daily.  You can use up your 10 mg tablets by taking 1-1/2 twice a day and then the new prescription will be for the 15 mg dose at your pharmacy  Please give us an update after a week or so to let us know how you are doing with your anxiety, your dizziness, and your anxiety.  Please call sooner if you feel like your condition is worsening as opposed to getting better.  If everything goes according to plan I would see you back in 6 months for another checkup

## 2025-01-28 NOTE — ASSESSMENT & PLAN NOTE
-We are increasing her BuSpar from 10 mg up to 15 mg twice daily  -She has agreed to give us an update after couple of weeks about her anxiety, her dizziness, and her gastrointestinal symptoms such as nausea.